# Patient Record
Sex: MALE | URBAN - METROPOLITAN AREA
[De-identification: names, ages, dates, MRNs, and addresses within clinical notes are randomized per-mention and may not be internally consistent; named-entity substitution may affect disease eponyms.]

---

## 2017-05-01 ENCOUNTER — OUTPATIENT (OUTPATIENT)
Dept: OUTPATIENT SERVICES | Facility: HOSPITAL | Age: 47
LOS: 1 days | End: 2017-05-01

## 2018-02-05 DIAGNOSIS — R69 ILLNESS, UNSPECIFIED: ICD-10-CM

## 2018-07-01 ENCOUNTER — OUTPATIENT (OUTPATIENT)
Dept: OUTPATIENT SERVICES | Facility: HOSPITAL | Age: 48
LOS: 1 days | End: 2018-07-01
Payer: MEDICARE

## 2018-07-08 DIAGNOSIS — Z71.89 OTHER SPECIFIED COUNSELING: ICD-10-CM

## 2019-05-24 VITALS
HEIGHT: 70 IN | HEART RATE: 69 BPM | TEMPERATURE: 98 F | DIASTOLIC BLOOD PRESSURE: 88 MMHG | WEIGHT: 264.55 LBS | SYSTOLIC BLOOD PRESSURE: 160 MMHG | OXYGEN SATURATION: 100 % | RESPIRATION RATE: 16 BRPM

## 2019-05-24 RX ORDER — CHLORHEXIDINE GLUCONATE 213 G/1000ML
1 SOLUTION TOPICAL ONCE
Refills: 0 | Status: DISCONTINUED | OUTPATIENT
Start: 2019-05-28 | End: 2019-06-12

## 2019-05-24 NOTE — H&P ADULT - ASSESSMENT
7yo obese (BMI 37.8) Male current smoker with SHELLFISH ALLERGY with PMHx of HTN, GERD, NAOMY, and PATRICE (on CPAP), Explosive Disorder, who presented to [facility] for recommended cardiac cath with possible intervention in light of pt's risk factors, abnormal ECG, abnormal NST and class 3 anginal symptoms.    ASA III and Mallampati III    OF NOTE: Pt was loaded with  mg PO x1 and plavix 600 mg PO x1 prior to procedure.    Pt was premedicated with Solucortef 200 IV push and Benadryl 50 mg IV  push x1 prior to procedure as per Dr. Rogers.    Risks & benefits of procedure and alternative therapy have been explained to the patient including but not limited to: allergic reaction, bleeding w/possible need for blood transfusion, infection, renal and vascular compromise, limb damage, arrhythmia, stroke, vessel dissection/perforation, Myocardial infarction, emergent CABG. Informed consent obtained and in chart. 9yo obese (BMI 37.8) Male current smoker with SHELLFISH ALLERGY with PMHx of HTN, GERD, NAOMY, and PATRICE (on CPAP), Explosive Disorder, who presented to [facility] for recommended cardiac cath with possible intervention in light of pt's risk factors, abnormal ECG, abnormal NST and class 3 anginal symptoms.    ASA III and Mallampati III    OF NOTE: Pt was loaded with  mg PO x1 and plavix 600 mg PO x1 prior to procedure.    Pt was premedicated with Solucortef 200 IV push and Benadryl 50 mg IV  push x1 prior to procedure as per Dr. Rogers.    Pt's Potassium is hemolyzed , will be rechecked in the room as per Dr. Man.     Risks & benefits of procedure and alternative therapy have been explained to the patient including but not limited to: allergic reaction, bleeding w/possible need for blood transfusion, infection, renal and vascular compromise, limb damage, arrhythmia, stroke, vessel dissection/perforation, Myocardial infarction, emergent CABG. Informed consent obtained and in chart. 9yo obese (BMI 37.8) Male current smoker with SHELLFISH ALLERGY with PMHx of HTN, GERD, NAOMY, and PATRICE (on CPAP), Explosive Disorder, who presented to [facility] for recommended cardiac cath with possible intervention in light of pt's risk factors, abnormal ECG, abnormal NST and class 3 anginal symptoms.    ASA III and Mallampati III    OF NOTE: Pt was loaded with  mg PO x1 and plavix 600 mg PO x1 prior to procedure.    No premedication needed as per Dr. Man for shellfish allergy.     Pt's Potassium is hemolyzed , will be rechecked in the room as per Dr. Man.     Risks & benefits of procedure and alternative therapy have been explained to the patient including but not limited to: allergic reaction, bleeding w/possible need for blood transfusion, infection, renal and vascular compromise, limb damage, arrhythmia, stroke, vessel dissection/perforation, Myocardial infarction, emergent CABG. Informed consent obtained and in chart.

## 2019-05-24 NOTE — H&P ADULT - NSHPSOCIALHISTORY_GEN_ALL_CORE
TOBACCO: 1/2 pk cigarettes per day > 20years  Denies EToH, Illicit Drug use    on Disability, ambulates with walker

## 2019-05-24 NOTE — H&P ADULT - HISTORY OF PRESENT ILLNESS
SKELETON    47yo morbidly obese M with PMHx of HTN who presented to his cardiologist complaining of worsening SOB on minimal ambulation SKELETON    49yo morbidly obese M with PMHx of HTN and PATRICE (on CPAP) who presented to his cardiologist complaining of worsening SOB on minimal ambulation. NST 4/12/19 revealed normal myocardial perfusion imaging, severe global hypokinesis with EF 28%, dilated cardiomyopathy with end diastolic volume 158cc, transient ischemic dilatation noted. Echo 5/21/19 revealed moderate concentric LVH, EF 55%, grade 2-3 diastolic dysfunction consistent with impaired relaxation and mild-moderate increase in filling pressures. SKELETON    47yo morbidly obese M with PMHx of HTN and PATRICE (on CPAP) who presented to his cardiologist complaining of worsening SOB on minimal ambulation. NST 4/12/19 revealed normal myocardial perfusion imaging, severe global hypokinesis with EF 28%, dilated cardiomyopathy with end diastolic volume 158cc, transient ischemic dilatation noted. Echo 5/21/19 revealed moderate concentric LVH, EF 55%, grade 2-3 diastolic dysfunction consistent with impaired relaxation and mild-moderate increase in filling pressures.    In light of patient's symptoms, class III anginal equivalent symptoms, reduced EF and abnormal NST showing TID, patient is now referred to [facility] for recommended cardiac catheterization with possible intervention. 47yo obese (BMI 37.8) Male current smoker with SHELLFISH ALLERGY with PMHx of HTN, GERD, NAOMY, and PATRICE (on CPAP), Explosive Disorder, who was referred to Cardiologist after having abnormal ECG at PMD office.  Pt reports complaints of worsening SOB on minimal ambulation less than 1 block which is a decrease in activity tolerance since January (states could walk few blocks at his pace). He also endorses exertional LSCP described as "pressure like pulling sensation" that is relieved with rest.  He has chronic LE edema and endorses he chronically sleeps on 2 pillows "stuffed with some towels to elevate his head".  He denies any palpitations, dizziness, n/v, syncope, PND.  In office, EKG showed SR inferior ST depressions with LVH.  NST 4/12/19 revealed normal myocardial perfusion imaging, severe global hypokinesis with EF 28%, dilated cardiomyopathy with end diastolic volume 158cc, transient ischemic dilatation noted. Echo 5/21/19 revealed moderate concentric LVH, EF 55%, grade 2-3 diastolic dysfunction consistent with impaired relaxation and mild-moderate increase in filling pressures.    In light of patient's symptoms, class III anginal equivalent symptoms, Discordant LV function on non-invasive testing with abnormal NST showing TID, patient is now referred to [facility] for recommended cardiac catheterization with possible intervention.

## 2019-05-24 NOTE — H&P ADULT - NSICDXFAMILYHX_GEN_ALL_CORE_FT
FAMILY HISTORY:  Family history of AIDS, Father -   Family history of kidney disease in mother, ESRD on HD  Family hx of hypertension, mother  FH: diabetes mellitus, mother

## 2019-05-24 NOTE — H&P ADULT - NSICDXPASTMEDICALHX_GEN_ALL_CORE_FT
PAST MEDICAL HISTORY:  HTN (hypertension)     PATRICE on CPAP PAST MEDICAL HISTORY:  Asthma     Explosive personality disorder in adult goes to [facility]    GERD without esophagitis     H/O iron deficiency     HTN (hypertension)     Lactose intolerance     Low back pain     PATRICE on CPAP

## 2019-05-28 ENCOUNTER — OUTPATIENT (OUTPATIENT)
Dept: OUTPATIENT SERVICES | Facility: HOSPITAL | Age: 49
LOS: 1 days | Discharge: HOME | End: 2019-05-28
Payer: MEDICARE

## 2019-05-28 DIAGNOSIS — Z98.890 OTHER SPECIFIED POSTPROCEDURAL STATES: Chronic | ICD-10-CM

## 2019-05-28 LAB
ALBUMIN SERPL ELPH-MCNC: 4.1 G/DL — SIGNIFICANT CHANGE UP (ref 3.3–5)
ALP SERPL-CCNC: SIGNIFICANT CHANGE UP U/L (ref 40–120)
ALT FLD-CCNC: SIGNIFICANT CHANGE UP U/L (ref 10–45)
ANION GAP SERPL CALC-SCNC: 13 MMOL/L — SIGNIFICANT CHANGE UP (ref 5–17)
ANION GAP SERPL CALC-SCNC: 14 MMOL/L — SIGNIFICANT CHANGE UP (ref 5–17)
APTT BLD: 31.2 SEC — SIGNIFICANT CHANGE UP (ref 27.5–36.3)
AST SERPL-CCNC: SIGNIFICANT CHANGE UP U/L (ref 10–40)
BASOPHILS # BLD AUTO: 0.03 K/UL — SIGNIFICANT CHANGE UP (ref 0–0.2)
BASOPHILS NFR BLD AUTO: 0.4 % — SIGNIFICANT CHANGE UP (ref 0–2)
BILIRUB SERPL-MCNC: 0.4 MG/DL — SIGNIFICANT CHANGE UP (ref 0.2–1.2)
BUN SERPL-MCNC: 10 MG/DL — SIGNIFICANT CHANGE UP (ref 7–23)
BUN SERPL-MCNC: 12 MG/DL — SIGNIFICANT CHANGE UP (ref 7–23)
CALCIUM SERPL-MCNC: 8.8 MG/DL — SIGNIFICANT CHANGE UP (ref 8.4–10.5)
CALCIUM SERPL-MCNC: 9 MG/DL — SIGNIFICANT CHANGE UP (ref 8.4–10.5)
CHLORIDE SERPL-SCNC: 103 MMOL/L — SIGNIFICANT CHANGE UP (ref 96–108)
CHLORIDE SERPL-SCNC: 105 MMOL/L — SIGNIFICANT CHANGE UP (ref 96–108)
CHOLEST SERPL-MCNC: 121 MG/DL — SIGNIFICANT CHANGE UP (ref 10–199)
CK MB CFR SERPL CALC: 3 NG/ML — SIGNIFICANT CHANGE UP (ref 0–6.7)
CK SERPL-CCNC: SIGNIFICANT CHANGE UP U/L (ref 30–200)
CO2 SERPL-SCNC: 23 MMOL/L — SIGNIFICANT CHANGE UP (ref 22–31)
CO2 SERPL-SCNC: 26 MMOL/L — SIGNIFICANT CHANGE UP (ref 22–31)
CREAT SERPL-MCNC: 0.92 MG/DL — SIGNIFICANT CHANGE UP (ref 0.5–1.3)
CREAT SERPL-MCNC: 1.06 MG/DL — SIGNIFICANT CHANGE UP (ref 0.5–1.3)
EOSINOPHIL # BLD AUTO: 0.13 K/UL — SIGNIFICANT CHANGE UP (ref 0–0.5)
EOSINOPHIL NFR BLD AUTO: 1.7 % — SIGNIFICANT CHANGE UP (ref 0–6)
GLUCOSE SERPL-MCNC: 117 MG/DL — HIGH (ref 70–99)
GLUCOSE SERPL-MCNC: 175 MG/DL — HIGH (ref 70–99)
HBA1C BLD-MCNC: 6.1 % — HIGH (ref 4–5.6)
HCT VFR BLD CALC: 40 % — SIGNIFICANT CHANGE UP (ref 39–50)
HDLC SERPL-MCNC: 17 MG/DL — LOW
HGB BLD-MCNC: 12.9 G/DL — LOW (ref 13–17)
IMM GRANULOCYTES NFR BLD AUTO: 0.4 % — SIGNIFICANT CHANGE UP (ref 0–1.5)
INR BLD: 1.09 — SIGNIFICANT CHANGE UP (ref 0.88–1.16)
LIPID PNL WITH DIRECT LDL SERPL: 68 MG/DL — SIGNIFICANT CHANGE UP
LYMPHOCYTES # BLD AUTO: 2.91 K/UL — SIGNIFICANT CHANGE UP (ref 1–3.3)
LYMPHOCYTES # BLD AUTO: 37.5 % — SIGNIFICANT CHANGE UP (ref 13–44)
MCHC RBC-ENTMCNC: 29.1 PG — SIGNIFICANT CHANGE UP (ref 27–34)
MCHC RBC-ENTMCNC: 32.3 GM/DL — SIGNIFICANT CHANGE UP (ref 32–36)
MCV RBC AUTO: 90.3 FL — SIGNIFICANT CHANGE UP (ref 80–100)
MONOCYTES # BLD AUTO: 0.56 K/UL — SIGNIFICANT CHANGE UP (ref 0–0.9)
MONOCYTES NFR BLD AUTO: 7.2 % — SIGNIFICANT CHANGE UP (ref 2–14)
NEUTROPHILS # BLD AUTO: 4.1 K/UL — SIGNIFICANT CHANGE UP (ref 1.8–7.4)
NEUTROPHILS NFR BLD AUTO: 52.8 % — SIGNIFICANT CHANGE UP (ref 43–77)
NRBC # BLD: 0 /100 WBCS — SIGNIFICANT CHANGE UP (ref 0–0)
PLATELET # BLD AUTO: 231 K/UL — SIGNIFICANT CHANGE UP (ref 150–400)
POTASSIUM SERPL-MCNC: 3.1 MMOL/L — LOW (ref 3.5–5.3)
POTASSIUM SERPL-MCNC: SIGNIFICANT CHANGE UP MMOL/L (ref 3.5–5.3)
POTASSIUM SERPL-SCNC: 3.1 MMOL/L — LOW (ref 3.5–5.3)
POTASSIUM SERPL-SCNC: SIGNIFICANT CHANGE UP MMOL/L (ref 3.5–5.3)
PROT SERPL-MCNC: 7.3 G/DL — SIGNIFICANT CHANGE UP (ref 6–8.3)
PROTHROM AB SERPL-ACNC: 12.4 SEC — SIGNIFICANT CHANGE UP (ref 10–12.9)
RBC # BLD: 4.43 M/UL — SIGNIFICANT CHANGE UP (ref 4.2–5.8)
RBC # FLD: 13.8 % — SIGNIFICANT CHANGE UP (ref 10.3–14.5)
SODIUM SERPL-SCNC: 142 MMOL/L — SIGNIFICANT CHANGE UP (ref 135–145)
SODIUM SERPL-SCNC: 142 MMOL/L — SIGNIFICANT CHANGE UP (ref 135–145)
TOTAL CHOLESTEROL/HDL RATIO MEASUREMENT: 7.1 RATIO — SIGNIFICANT CHANGE UP (ref 3.4–9.6)
TRIGL SERPL-MCNC: 182 MG/DL — HIGH (ref 10–149)
WBC # BLD: 7.76 K/UL — SIGNIFICANT CHANGE UP (ref 3.8–10.5)
WBC # FLD AUTO: 7.76 K/UL — SIGNIFICANT CHANGE UP (ref 3.8–10.5)

## 2019-05-28 PROCEDURE — 93458 L HRT ARTERY/VENTRICLE ANGIO: CPT | Mod: 26

## 2019-05-28 PROCEDURE — 99204 OFFICE O/P NEW MOD 45 MIN: CPT

## 2019-05-28 PROCEDURE — 93010 ELECTROCARDIOGRAM REPORT: CPT

## 2019-05-28 RX ORDER — CLOPIDOGREL BISULFATE 75 MG/1
600 TABLET, FILM COATED ORAL ONCE
Refills: 0 | Status: COMPLETED | OUTPATIENT
Start: 2019-05-28 | End: 2019-05-28

## 2019-05-28 RX ORDER — SODIUM CHLORIDE 9 MG/ML
500 INJECTION, SOLUTION INTRAVENOUS
Refills: 0 | Status: DISCONTINUED | OUTPATIENT
Start: 2019-05-28 | End: 2019-05-28

## 2019-05-28 RX ORDER — POTASSIUM CHLORIDE 20 MEQ
10 PACKET (EA) ORAL ONCE
Refills: 0 | Status: COMPLETED | OUTPATIENT
Start: 2019-05-28 | End: 2019-05-28

## 2019-05-28 RX ORDER — POTASSIUM CHLORIDE 20 MEQ
60 PACKET (EA) ORAL ONCE
Refills: 0 | Status: COMPLETED | OUTPATIENT
Start: 2019-05-28 | End: 2019-05-28

## 2019-05-28 RX ORDER — POTASSIUM CHLORIDE 20 MEQ
10 PACKET (EA) ORAL ONCE
Refills: 0 | Status: DISCONTINUED | OUTPATIENT
Start: 2019-05-28 | End: 2019-06-12

## 2019-05-28 RX ORDER — POTASSIUM CHLORIDE 20 MEQ
40 PACKET (EA) ORAL ONCE
Refills: 0 | Status: COMPLETED | OUTPATIENT
Start: 2019-05-28 | End: 2019-05-28

## 2019-05-28 RX ORDER — POTASSIUM CHLORIDE 20 MEQ
1 PACKET (EA) ORAL
Qty: 5 | Refills: 0
Start: 2019-05-28 | End: 2019-06-01

## 2019-05-28 RX ORDER — SODIUM CHLORIDE 9 MG/ML
500 INJECTION, SOLUTION INTRAVENOUS
Refills: 0 | Status: DISCONTINUED | OUTPATIENT
Start: 2019-05-28 | End: 2019-06-12

## 2019-05-28 RX ORDER — ASPIRIN/CALCIUM CARB/MAGNESIUM 324 MG
325 TABLET ORAL ONCE
Refills: 0 | Status: COMPLETED | OUTPATIENT
Start: 2019-05-28 | End: 2019-05-28

## 2019-05-28 RX ADMIN — Medication 60 MILLIEQUIVALENT(S): at 14:52

## 2019-05-28 RX ADMIN — Medication 40 MILLIEQUIVALENT(S): at 18:15

## 2019-05-28 RX ADMIN — Medication 100 MILLIEQUIVALENT(S): at 14:52

## 2019-05-28 RX ADMIN — SODIUM CHLORIDE 30 MILLILITER(S): 9 INJECTION, SOLUTION INTRAVENOUS at 12:40

## 2019-05-28 RX ADMIN — CLOPIDOGREL BISULFATE 600 MILLIGRAM(S): 75 TABLET, FILM COATED ORAL at 12:39

## 2019-05-28 RX ADMIN — Medication 325 MILLIGRAM(S): at 12:39

## 2019-05-28 NOTE — PROGRESS NOTE ADULT - SUBJECTIVE AND OBJECTIVE BOX
Interventional Cardiology PA SDA Discharge Note    Patient without complaints. Ambulated and voided without difficulties    Afebrile, VSS    Ext:    		  		Right              Radial :  No  hematoma, no    bleeding, dressing; C/D/I      Pulses:    intact RAD to baseline     A/P:      47yo obese (BMI 37.8) Male current smoker with SHELLFISH ALLERGY with PMHx of HTN, GERD, NAOMY, and PATRICE (on CPAP), Explosive Disorder, who was referred to Cardiologist after having abnormal ECG at PMD office.  Pt reports complaints of worsening SOB on minimal ambulation less than 1 block which is a decrease in activity tolerance since January (states could walk few blocks at his pace). He also endorses exertional LSCP described as "pressure like pulling sensation" that is relieved with rest.  He has chronic LE edema and endorses he chronically sleeps on 2 pillows "stuffed with some towels to elevate his head".  He denies any palpitations, dizziness, n/v, syncope, PND.  In office, EKG showed SR inferior ST depressions with LVH.  NST 4/12/19 revealed normal myocardial perfusion imaging, severe global hypokinesis with EF 28%, dilated cardiomyopathy with end diastolic volume 158cc, transient ischemic dilatation noted. Echo 5/21/19 revealed moderate concentric LVH, EF 55%, grade 2-3 diastolic dysfunction consistent with impaired relaxation and mild-moderate increase in filling pressures.  In light of patient's symptoms, class III anginal equivalent symptoms, Discordant LV function on non-invasive testing with abnormal NST showing TID, patient is now referred to [facility] for recommended cardiac catheterization with possible intervention. Pt now s/p cardiac cath 5/28/19 showing non obstructive CAD, EF 50%, EDP 25 mmHg, K+ 2.7 sent from room.  Potassium repleated post cath with Kdur 60 mEq x1, K+ 10 mEq IV x1, Kdur 40 mEq x1 @ 6pm with repeat BMP scheduled for 6pm prior to discharge.               1.	Stable for discharge as per attending Dr. Man after bed rest, pt voids, wrist stable and 30 minutes of ambulation.  2.	Follow-up with PMD/Cardiologist Dr. Jackson in 1-2 weeks  3.	Discharged forms signed and copies in chart Interventional Cardiology PA SDA Discharge Note    Patient without complaints. Ambulated and voided without difficulties    Afebrile, VSS    Ext:    		  		Right              Radial :  No  hematoma, no    bleeding, dressing; C/D/I      Pulses:    intact RAD to baseline     A/P:      49yo obese (BMI 37.8) Male current smoker with SHELLFISH ALLERGY with PMHx of HTN, GERD, NAOMY, and PATRICE (on CPAP), Explosive Disorder, who was referred to Cardiologist after having abnormal ECG at PMD office.  Pt reports complaints of worsening SOB on minimal ambulation less than 1 block which is a decrease in activity tolerance since January (states could walk few blocks at his pace). He also endorses exertional LSCP described as "pressure like pulling sensation" that is relieved with rest.  He has chronic LE edema and endorses he chronically sleeps on 2 pillows "stuffed with some towels to elevate his head".  He denies any palpitations, dizziness, n/v, syncope, PND.  In office, EKG showed SR inferior ST depressions with LVH.  NST 4/12/19 revealed normal myocardial perfusion imaging, severe global hypokinesis with EF 28%, dilated cardiomyopathy with end diastolic volume 158cc, transient ischemic dilatation noted. Echo 5/21/19 revealed moderate concentric LVH, EF 55%, grade 2-3 diastolic dysfunction consistent with impaired relaxation and mild-moderate increase in filling pressures.  In light of patient's symptoms, class III anginal equivalent symptoms, Discordant LV function on non-invasive testing with abnormal NST showing TID, patient is now referred to [facility] for recommended cardiac catheterization with possible intervention. Pt now s/p cardiac cath 5/28/19 showing non obstructive CAD, EF 50%, EDP 25 mmHg, K+ 2.7 sent from room.  Potassium repleated post cath with Kdur 60 mEq x1, K+ 10 mEq IV x1, Kdur 40 mEq x1 @ 6pm with repeat BMP scheduled @ 6pm K 3.1 and Istat 3.2.  Next dose of 40 mEq would not be given until 10 pm and pt unable to stay for additional runs of IV potassium due to his family member needing to check into a shelter tonight before 10 pm.  As discussed with Dr. Man pt discharged with Potassium 20 mEq daily x 5 days which was e-prescribed to his preferred pharmacy with instructions to follow up with his PCP after 5 days to follow up his potassium level.             1.	Stable for discharge as per attending Dr. Man after bed rest, pt voids, wrist stable and 30 minutes of ambulation.  2.	Follow-up with PMD/Cardiologist Dr. Jackson in 1-2 weeks  3.	Discharged forms signed and copies in chart

## 2019-06-03 DIAGNOSIS — I10 ESSENTIAL (PRIMARY) HYPERTENSION: ICD-10-CM

## 2019-06-03 DIAGNOSIS — R93.1 ABNORMAL FINDINGS ON DIAGNOSTIC IMAGING OF HEART AND CORONARY CIRCULATION: ICD-10-CM

## 2019-06-03 DIAGNOSIS — I25.110 ATHEROSCLEROTIC HEART DISEASE OF NATIVE CORONARY ARTERY WITH UNSTABLE ANGINA PECTORIS: ICD-10-CM

## 2021-12-01 PROCEDURE — G9005: CPT

## 2022-05-19 VITALS
TEMPERATURE: 98 F | WEIGHT: 263.01 LBS | RESPIRATION RATE: 18 BRPM | DIASTOLIC BLOOD PRESSURE: 99 MMHG | SYSTOLIC BLOOD PRESSURE: 189 MMHG | HEART RATE: 80 BPM | OXYGEN SATURATION: 100 % | HEIGHT: 68 IN

## 2022-05-19 RX ORDER — CHLORHEXIDINE GLUCONATE 213 G/1000ML
1 SOLUTION TOPICAL ONCE
Refills: 0 | Status: DISCONTINUED | OUTPATIENT
Start: 2022-06-14 | End: 2022-06-29

## 2022-05-19 NOTE — H&P ADULT - NSICDXPASTMEDICALHX_GEN_ALL_CORE_FT
PAST MEDICAL HISTORY:  Asthma     Explosive personality disorder in adult goes to [facility]    GERD without esophagitis     H/O iron deficiency     HTN (hypertension)     Lactose intolerance     Low back pain     PATRICE on CPAP

## 2022-05-19 NOTE — H&P ADULT - ASSESSMENT
52yo Male, current daily smoker (up to 1 PPD), occasional marijuana user with a known SHELLFISH ALLERGY (angioedema), and a PMHx of non-obstructive CAD (s/p cardiac cath 5/28/2019 @ [facility]), HTN, HLD, GERD, iron deficiency anemia, PATRICE (on CPAP), and explosive personality disorder presents for cardiac catheterization with possible intervention if clinically indicated due to patient's risk factors, CCS Angina Class IV Symptoms, abnormal NST.     Risks & benefits of procedure and alternative therapy have been explained to the patient including but not limited to: allergic reaction, bleeding w/possible need for blood transfusion, infection, renal and vascular compromise, limb damage, arrhythmia, stroke, vessel dissection/perforation, Myocardial infarction, emergent CABG. Informed consent obtained and in chart.    -H/H: 12.9/39.8. Pt denies BRBPR, hematuria, hematochezia, melena. Patient did not take any medication today; To load with  mg x one dose and Plavix 600 mg x one dose.  -Cr: _____. EF 38% by NST. Euvolemic on exam.  Will order NS @ 75 cc/hour x four hours pre procedure  -Shellfish Allergy: Angioedema; To receive Solucortef 200 mg IV x one dose (To be within 4 hours of procedure) and Benadryl 50 mg IVP ON CALL TO CATH LAB  -Cardiac Catheterization ordered placed   -Home Medication Confirmed via:   -Type of sedation: Moderate  -Candidate for sedation: Yes  50yo Male, current daily smoker (up to 1 PPD), occasional marijuana user with a known SHELLFISH ALLERGY (angioedema), and a PMHx of non-obstructive CAD (s/p cardiac cath 5/28/2019 @ [facility]), HTN, HLD, GERD, iron deficiency anemia, PATRICE (on CPAP), and explosive personality disorder presents for cardiac catheterization with possible intervention if clinically indicated due to patient's risk factors, CCS Angina Class IV Symptoms, abnormal NST.     Risks & benefits of procedure and alternative therapy have been explained to the patient including but not limited to: allergic reaction, bleeding w/possible need for blood transfusion, infection, renal and vascular compromise, limb damage, arrhythmia, stroke, vessel dissection/perforation, Myocardial infarction, emergent CABG. Informed consent obtained and in chart.    -H/H: 12.9/39.8. Pt denies BRBPR, hematuria, hematochezia, melena. Patient did not take any medication today; To load with  mg x one dose and Plavix 600 mg x one dose.  -Cr: 0.99.  EF 38% by NST. Euvolemic on exam.  Will order NS @ 75 cc/hour x four hours pre procedure  -Shellfish Allergy: Angioedema; To receive Solucortef 200 mg IV x one dose (To be within 4 hours of procedure) and Benadryl 50 mg IVP ON CALL TO CATH LAB  -Cardiac Catheterization ordered placed   -Home Medication Confirmed via:  [facility] ([id number] [address]). Medications confirmed with pharmacy and  Nicholas: [phone]. Patient does not names of any of his medications; but notes he did not take anything and no longer takes any meds for explosive personality disorder.  -Type of sedation: Moderate  -Candidate for sedation: Yes  50yo Male, current daily smoker (up to 1 PPD), occasional marijuana user with a known SHELLFISH ALLERGY (angioedema), and a PMHx of non-obstructive CAD (s/p cardiac cath 5/28/2019 @ [facility]), HTN, HLD, GERD, iron deficiency anemia, PATRICE (on CPAP), and explosive personality disorder presents for cardiac catheterization with possible intervention if clinically indicated due to patient's risk factors, CCS Angina Class IV Symptoms, abnormal NST.     Risks & benefits of procedure and alternative therapy have been explained to the patient including but not limited to: allergic reaction, bleeding w/possible need for blood transfusion, infection, renal and vascular compromise, limb damage, arrhythmia, stroke, vessel dissection/perforation, Myocardial infarction, emergent CABG. Informed consent obtained and in chart.    COVID: PENDING (can't accept the results from 6/7/2022)/In addition, patient brought in Rapid Ag test from [facility]; will need to reswab.  -H/H: 12.9/39.8. Pt denies BRBPR, hematuria, hematochezia, melena. Patient did not take any medication today; To load with  mg x one dose and Plavix 600 mg x one dose.  -Cr: 0.99.  EF 38% by NST. Euvolemic on exam.  Will order NS @ 75 cc/hour x four hours pre procedure  -Shellfish Allergy: Angioedema; To receive Solucortef 200 mg IV x one dose (To be within 4 hours of procedure) and Benadryl 50 mg IVP ON CALL TO CATH LAB  -Cardiac Catheterization ordered placed   -Home Medication Confirmed via:  [facility] ([address] [place] [address] Code). Medications confirmed with pharmacy and  Nicholas: [phone]. Patient does not names of any of his medications; but notes he did not take anything and no longer takes any meds for explosive personality disorder.  -Type of sedation: Moderate  -Candidate for sedation: Yes

## 2022-05-19 NOTE — H&P ADULT - HISTORY OF PRESENT ILLNESS
CARDIOLOGIST: Dr. Harish Jackson  COVID: ________________  PHARMACY: ___________    Pt is 52yo Male w/ SHELLFISH ALLERGY and PMHx of HTN, HLD, GERD, iron deficiency anemia, PATRICE (on CPAP), and explosive personality disorder who presented to cardiologist, Dr. Jackson, for cardiac evaluation. Patient denying symptoms at this time. Pt denies CP, SOB, PARTIDA, palpitations, LE edema, orthopnea, PND, abdominal pain, N/V, fever/chills. Patient was sent for NST, which resulted abnormal.     NST (1/28/22): medium sized reversible defect in lateral wall, small reversible defect in anterior wall, and small nonreversible defect involving the inferior wall w/ an adjacent small reversible defect in the inferior wall.     In light of patient's risk factors and abnormal NST, patient now presents to [facility] for cardiac catheterization with possible intervention if clinically indicated.       Cardiac Cath (5/28/2019): LM normal, mLAD 30% (medium sized), dLAD mild disease (small), LCx mild LI (co-dominant vessel), OM1 mild disease (medium sized). CARDIOLOGIST: Dr. Harish Jackson  COVID: ________________  PHARMACY: ___________    Pt is 52yo Male w/ SHELLFISH ALLERGY and PMHx of non obstructive CAD (s/p cardiac cath 5/28/2019 @ [facility]),  HTN, HLD, GERD, iron deficiency anemia, PATRICE (on CPAP), and explosive personality disorder who presented to cardiologist, Dr. Jackson, for cardiac evaluation. Patient denying symptoms at this time. Pt denies CP, SOB, PARTIDA, palpitations, LE edema, orthopnea, PND, abdominal pain, N/V, fever/chills. Patient was sent for NST, which resulted abnormal.     NST (1/28/22): medium sized reversible defect in lateral wall, small reversible defect in anterior wall, and small nonreversible defect involving the inferior wall w/ an adjacent small reversible defect in the inferior wall.     In light of patient's risk factors and abnormal NST, patient now presents to [facility] for cardiac catheterization with possible intervention if clinically indicated.       Cardiac Cath (5/28/2019): LM normal, mLAD 30% (medium sized), dLAD mild disease (small), LCx mild LI (co-dominant vessel), OM1 mild disease (medium sized). CARDIOLOGIST: Dr. Harish Jackson  COVID: ________________  PHARMACY: ___________    Pt is 50yo Male w/ SHELLFISH ALLERGY and PMHx of non obstructive CAD (s/p cardiac cath 5/28/2019 @ [facility]),  HTN, HLD, GERD, iron deficiency anemia, PATRICE (on CPAP), and explosive personality disorder who presented to cardiologist, Dr. Jackson, for cardiac evaluation. Pt denies CP, SOB, PARTIDA, palpitations, LE edema, orthopnea, PND, abdominal pain, N/V, fever/chills. Patient was sent for NST, which resulted abnormal.     Myocardial Perfusion PET/CT (1/28/22): medium sized reversible defect in lateral wall, small reversible defect in anterior wall, and small nonreversible defect involving the inferior wall w/ an adjacent small reversible defect in the inferior wall. The presence of a stress induced anterior wall motion abnormality suggests that the LAD stenosis is severe. LVEF 38%, severe hypokinesis in the lateral wall, moderate hypokinesis in the inferior wall and mild hypokinesis in the anterior and apical walls. Worsening of wall motion in the anterior, inferior and lateral wall.     In light of patient's risk factors and abnormal NST, patient now presents to [facility] for cardiac catheterization with possible intervention if clinically indicated.     Prior Cath History  Cardiac Cath (5/28/2019): LM normal, mLAD 30% (medium sized), dLAD mild disease (small), LCx mild LI (co-dominant vessel), OM1 mild disease (medium sized). CARDIOLOGIST: Dr. Harish Jackson  COVID: ________________  PHARMACY: [facility] ([facility] Zip Code)    50yo Male, current daily smoker (up to 1 PPD), occasional marijuana user with a known SHELLFISH ALLERGY (angioedema), and a PMHx of non-obstructive CAD (s/p cardiac cath 5/28/2019 @ [facility]), HTN, HLD, GERD, iron deficiency anemia, PATRICE (on CPAP), and explosive personality disorder who presented to cardiologist, Dr. Jackson due to intermittent non radiating left sided chest pain when he is stressed, upset or excited that resolves once he calms down. In addition, patient endorses PARTIDA when ambulating more than 2.5 blocks and chronic lower extremity edema. Pt denies palpitations, orthopnea, PND, abdominal pain, N/V, fever/chills.   Myocardial Perfusion PET/CT (1/28/22): medium sized reversible defect in lateral wall, small reversible defect in anterior wall, and small nonreversible defect involving the inferior wall w/ an adjacent small reversible defect in the inferior wall. The presence of a stress induced anterior wall motion abnormality suggests that the LAD stenosis is severe. LVEF 38%, severe hypokinesis in the lateral wall, moderate hypokinesis in the inferior wall and mild hypokinesis in the anterior and apical walls. Worsening of wall motion in the anterior, inferior and lateral wall.   In light of patient's risk factors and abnormal NST, patient now presents to [facility] for cardiac catheterization with possible intervention if clinically indicated.   Prior Cath History  Cardiac Cath (5/28/2019): LM normal, mLAD 30% (medium sized), dLAD mild disease (small), LCx mild LI (co-dominant vessel), OM1 mild disease (medium sized)   CARDIOLOGIST: Dr. Harish Jackson  COVID: PENDING (can't accept the results from 6/7/2022)  PHARMACY: [facility] ([id number] [address])  52yo Male, current daily smoker (up to 1 PPD), occasional marijuana user with a known SHELLFISH ALLERGY (angioedema), and a PMHx of non-obstructive CAD (s/p cardiac cath 5/28/2019 @ [facility]), HTN, HLD, GERD, iron deficiency anemia, PATRICE (on CPAP), and explosive personality disorder who presented to cardiologist, Dr. Jackson due to intermittent non radiating left sided chest pain when he is stressed, upset or excited that resolves once he calms down. In addition, patient endorses PARTIDA when ambulating more than 2.5 blocks and chronic lower extremity edema. Pt denies palpitations, orthopnea, PND, abdominal pain, N/V, fever/chills.   Myocardial Perfusion PET/CT (1/28/22): medium sized reversible defect in lateral wall, small reversible defect in anterior wall, and small nonreversible defect involving the inferior wall w/ an adjacent small reversible defect in the inferior wall. The presence of a stress induced anterior wall motion abnormality suggests that the LAD stenosis is severe. LVEF 38%, severe hypokinesis in the lateral wall, moderate hypokinesis in the inferior wall and mild hypokinesis in the anterior and apical walls. Worsening of wall motion in the anterior, inferior and lateral wall.   In light of patient's risk factors, CCS Angina Class IV Symptoms, abnormal NST, patient now presents to [facility] for cardiac catheterization with possible intervention if clinically indicated.   Prior Cath History  Cardiac Cath (5/28/2019): LM normal, mLAD 30% (medium sized), dLAD mild disease (small), LCx mild LI (co-dominant vessel), OM1 mild disease (medium sized)   CARDIOLOGIST: Dr. Harish Jackson  COVID: PENDING (can't accept the results from 6/7/2022)/In addition, patient brought in Rapid Ag test from [facility]; will need to reswab.  PHARMACY: [facility] ([address] [address]). Medications confirmed with pharmacy and  Nicholas: [phone]  50yo Male, current daily smoker (up to 1 PPD), occasional marijuana user with a known SHELLFISH ALLERGY (angioedema), and a PMHx of non-obstructive CAD (s/p cardiac cath 5/28/2019 @ [facility]), HTN, HLD, GERD, iron deficiency anemia, PATRICE (on CPAP), and explosive personality disorder (currently not on any medication for this) who presented to cardiologist, Dr. Jackson due to intermittent non radiating left sided chest pain when he is stressed, upset or excited that resolves once he calms down. In addition, patient endorses PARTIDA when ambulating more than 2.5 blocks and chronic lower extremity edema. Pt denies palpitations, orthopnea, PND, abdominal pain, N/V, fever/chills.   Myocardial Perfusion PET/CT (1/28/22): medium sized reversible defect in lateral wall, small reversible defect in anterior wall, and small nonreversible defect involving the inferior wall w/ an adjacent small reversible defect in the inferior wall. The presence of a stress induced anterior wall motion abnormality suggests that the LAD stenosis is severe. LVEF 38%, severe hypokinesis in the lateral wall, moderate hypokinesis in the inferior wall and mild hypokinesis in the anterior and apical walls. Worsening of wall motion in the anterior, inferior and lateral wall.   In light of patient's risk factors, CCS Angina Class IV Symptoms, abnormal NST, patient now presents to [facility] for cardiac catheterization with possible intervention if clinically indicated.   Prior Cath History  Cardiac Cath (5/28/2019): LM normal, mLAD 30% (medium sized), dLAD mild disease (small), LCx mild LI (co-dominant vessel), OM1 mild disease (medium sized)

## 2022-06-14 ENCOUNTER — OUTPATIENT (OUTPATIENT)
Dept: OUTPATIENT SERVICES | Facility: HOSPITAL | Age: 52
LOS: 1 days | End: 2022-06-14
Payer: MEDICARE

## 2022-06-14 DIAGNOSIS — Z98.890 OTHER SPECIFIED POSTPROCEDURAL STATES: Chronic | ICD-10-CM

## 2022-06-14 DIAGNOSIS — Z11.52 ENCOUNTER FOR SCREENING FOR COVID-19: ICD-10-CM

## 2022-06-14 LAB
A1C WITH ESTIMATED AVERAGE GLUCOSE RESULT: 6.5 % — HIGH (ref 4–5.6)
ANION GAP SERPL CALC-SCNC: 11 MMOL/L — SIGNIFICANT CHANGE UP (ref 5–17)
APTT BLD: 29.2 SEC — SIGNIFICANT CHANGE UP (ref 27.5–35.5)
BASOPHILS # BLD AUTO: 0.03 K/UL — SIGNIFICANT CHANGE UP (ref 0–0.2)
BASOPHILS NFR BLD AUTO: 0.3 % — SIGNIFICANT CHANGE UP (ref 0–2)
BUN SERPL-MCNC: 13 MG/DL — SIGNIFICANT CHANGE UP (ref 7–23)
CALCIUM SERPL-MCNC: 9 MG/DL — SIGNIFICANT CHANGE UP (ref 8.4–10.5)
CHLORIDE SERPL-SCNC: 103 MMOL/L — SIGNIFICANT CHANGE UP (ref 96–108)
CHOLEST SERPL-MCNC: 127 MG/DL — SIGNIFICANT CHANGE UP
CK MB CFR SERPL CALC: 2.4 NG/ML — SIGNIFICANT CHANGE UP (ref 0–6.7)
CK SERPL-CCNC: 328 U/L — HIGH (ref 30–200)
CO2 SERPL-SCNC: 28 MMOL/L — SIGNIFICANT CHANGE UP (ref 22–31)
CREAT SERPL-MCNC: 0.99 MG/DL — SIGNIFICANT CHANGE UP (ref 0.5–1.3)
EGFR: 93 ML/MIN/1.73M2 — SIGNIFICANT CHANGE UP
EOSINOPHIL # BLD AUTO: 0.15 K/UL — SIGNIFICANT CHANGE UP (ref 0–0.5)
EOSINOPHIL NFR BLD AUTO: 1.7 % — SIGNIFICANT CHANGE UP (ref 0–6)
ESTIMATED AVERAGE GLUCOSE: 140 MG/DL — HIGH (ref 68–114)
GLUCOSE SERPL-MCNC: 101 MG/DL — HIGH (ref 70–99)
HCT VFR BLD CALC: 39.8 % — SIGNIFICANT CHANGE UP (ref 39–50)
HDLC SERPL-MCNC: 19 MG/DL — LOW
HGB BLD-MCNC: 12.9 G/DL — LOW (ref 13–17)
IMM GRANULOCYTES NFR BLD AUTO: 0.5 % — SIGNIFICANT CHANGE UP (ref 0–1.5)
INR BLD: 1.06 — SIGNIFICANT CHANGE UP (ref 0.88–1.16)
LIPID PNL WITH DIRECT LDL SERPL: 80 MG/DL — SIGNIFICANT CHANGE UP
LYMPHOCYTES # BLD AUTO: 2.67 K/UL — SIGNIFICANT CHANGE UP (ref 1–3.3)
LYMPHOCYTES # BLD AUTO: 30.9 % — SIGNIFICANT CHANGE UP (ref 13–44)
MCHC RBC-ENTMCNC: 28.7 PG — SIGNIFICANT CHANGE UP (ref 27–34)
MCHC RBC-ENTMCNC: 32.4 GM/DL — SIGNIFICANT CHANGE UP (ref 32–36)
MCV RBC AUTO: 88.4 FL — SIGNIFICANT CHANGE UP (ref 80–100)
MONOCYTES # BLD AUTO: 0.7 K/UL — SIGNIFICANT CHANGE UP (ref 0–0.9)
MONOCYTES NFR BLD AUTO: 8.1 % — SIGNIFICANT CHANGE UP (ref 2–14)
NEUTROPHILS # BLD AUTO: 5.04 K/UL — SIGNIFICANT CHANGE UP (ref 1.8–7.4)
NEUTROPHILS NFR BLD AUTO: 58.5 % — SIGNIFICANT CHANGE UP (ref 43–77)
NON HDL CHOLESTEROL: 108 MG/DL — SIGNIFICANT CHANGE UP
NRBC # BLD: 0 /100 WBCS — SIGNIFICANT CHANGE UP (ref 0–0)
PLATELET # BLD AUTO: 208 K/UL — SIGNIFICANT CHANGE UP (ref 150–400)
POTASSIUM SERPL-MCNC: 2.9 MMOL/L — CRITICAL LOW (ref 3.5–5.3)
POTASSIUM SERPL-SCNC: 2.9 MMOL/L — CRITICAL LOW (ref 3.5–5.3)
PROTHROM AB SERPL-ACNC: 12.6 SEC — SIGNIFICANT CHANGE UP (ref 10.5–13.4)
RBC # BLD: 4.5 M/UL — SIGNIFICANT CHANGE UP (ref 4.2–5.8)
RBC # FLD: 14.1 % — SIGNIFICANT CHANGE UP (ref 10.3–14.5)
SARS-COV-2 RNA SPEC QL NAA+PROBE: SIGNIFICANT CHANGE UP
SODIUM SERPL-SCNC: 142 MMOL/L — SIGNIFICANT CHANGE UP (ref 135–145)
TRIGL SERPL-MCNC: 139 MG/DL — SIGNIFICANT CHANGE UP
WBC # BLD: 8.63 K/UL — SIGNIFICANT CHANGE UP (ref 3.8–10.5)
WBC # FLD AUTO: 8.63 K/UL — SIGNIFICANT CHANGE UP (ref 3.8–10.5)

## 2022-06-14 PROCEDURE — 93010 ELECTROCARDIOGRAM REPORT: CPT

## 2022-06-14 RX ORDER — DIPHENHYDRAMINE HCL 50 MG
50 CAPSULE ORAL ONCE
Refills: 0 | Status: DISCONTINUED | OUTPATIENT
Start: 2022-06-14 | End: 2022-06-29

## 2022-06-14 RX ORDER — HYDROCORTISONE 20 MG
200 TABLET ORAL ONCE
Refills: 0 | Status: COMPLETED | OUTPATIENT
Start: 2022-06-14 | End: 2022-06-14

## 2022-06-14 RX ORDER — CLOPIDOGREL BISULFATE 75 MG/1
600 TABLET, FILM COATED ORAL ONCE
Refills: 0 | Status: COMPLETED | OUTPATIENT
Start: 2022-06-14 | End: 2022-06-14

## 2022-06-14 RX ORDER — SODIUM CHLORIDE 9 MG/ML
500 INJECTION INTRAMUSCULAR; INTRAVENOUS; SUBCUTANEOUS
Refills: 0 | Status: DISCONTINUED | OUTPATIENT
Start: 2022-06-14 | End: 2022-06-29

## 2022-06-14 RX ORDER — ASPIRIN/CALCIUM CARB/MAGNESIUM 324 MG
325 TABLET ORAL ONCE
Refills: 0 | Status: COMPLETED | OUTPATIENT
Start: 2022-06-14 | End: 2022-06-14

## 2022-06-14 RX ADMIN — CLOPIDOGREL BISULFATE 600 MILLIGRAM(S): 75 TABLET, FILM COATED ORAL at 17:05

## 2022-06-14 RX ADMIN — Medication 325 MILLIGRAM(S): at 17:05

## 2022-06-14 RX ADMIN — SODIUM CHLORIDE 75 MILLILITER(S): 9 INJECTION INTRAMUSCULAR; INTRAVENOUS; SUBCUTANEOUS at 16:52

## 2022-06-14 RX ADMIN — Medication 200 MILLIGRAM(S): at 17:06

## 2022-06-27 VITALS
HEART RATE: 91 BPM | TEMPERATURE: 96 F | RESPIRATION RATE: 16 BRPM | SYSTOLIC BLOOD PRESSURE: 188 MMHG | DIASTOLIC BLOOD PRESSURE: 118 MMHG | OXYGEN SATURATION: 96 %

## 2022-06-27 RX ORDER — CHLORHEXIDINE GLUCONATE 213 G/1000ML
1 SOLUTION TOPICAL ONCE
Refills: 0 | Status: DISCONTINUED | OUTPATIENT
Start: 2022-06-28 | End: 2022-07-12

## 2022-06-27 NOTE — H&P ADULT - NSICDXPASTMEDICALHX_GEN_ALL_CORE_FT
PAST MEDICAL HISTORY:  Asthma     Explosive personality disorder in adult goes to [facility]    GERD without esophagitis     H/O iron deficiency     HTN (hypertension)     Lactose intolerance     Low back pain     PATRICE on CPAP

## 2022-06-27 NOTE — H&P ADULT - ASSESSMENT
52yo Male, current daily smoker (up to 1 PPD), occasional marijuana user with a known SHELLFISH ALLERGY (angioedema), and a PMHx of non-obstructive CAD (s/p cardiac cath 5/28/2019 @ [facility]), HTN, HLD, GERD, iron deficiency anemia, PATRICE (on CPAP), and explosive personality disorder (currently not on any medication for this) who presented to cardiologist, Dr. Jackson due to intermittent non radiating left sided chest pain when he is stressed, upset or excited that resolves once he calms down. In addition, patient endorses PARTIDA when ambulating more than 2.5 blocks and chronic lower extremity edema.     Pt H+H stable 4/13 and pt denies any BRBPR, GI bleed, melena, hematuria and denies taking ASA this morning. Pt loaded with 81 mg ASA and 600 plavix.  Pt Creatine 1.08 and appeared euvolemic on exam and got 250ml IV bolus NS and 50cc for 2 hours.  Pt potassium was 2.8 on Lab draw and received total of 60meq of K PO and will recheck BMP   Sedation Moderate  Pt candidate for moderate sedation    Risks & benefits of procedure and alternative therapy have been explained to the patient including but not limited to: allergic reaction, bleeding w/possible need for blood transfusion, infection, renal and vascular compromise, limb damage, arrhythmia, stroke, vessel dissection/perforation, Myocardial infarction, emergent CABG. Informed consent obtained and in chart.       Suitable for moderate sedation.

## 2022-06-27 NOTE — H&P ADULT - HISTORY OF PRESENT ILLNESS
CARDIOLOGIST: Dr. Harish Jackson  COVID: PCR 6/26 - ?to bring results  PHARMACY: [facility] ([address] [address])  Escort:    **Pt left last time prior to cath as he was waiting too long for the procedure  **meds confirmed w/ patient on last visit, states no changes     52yo Male, current daily smoker (up to 1 PPD), occasional marijuana user with a known SHELLFISH ALLERGY (angioedema), and a PMHx of non-obstructive CAD (s/p cardiac cath 5/28/2019 @ [facility]), HTN, HLD, GERD, iron deficiency anemia, PATRICE (on CPAP), and explosive personality disorder (currently not on any medication for this) who presented to cardiologist, Dr. Jackson due to intermittent non radiating left sided chest pain when he is stressed, upset or excited that resolves once he calms down. In addition, patient endorses PARTIDA when ambulating more than 2.5 blocks and chronic lower extremity edema. Pt denies palpitations, orthopnea, PND, abdominal pain, N/V, fever/chills.   Myocardial Perfusion PET/CT (1/28/22): medium sized reversible defect in lateral wall, small reversible defect in anterior wall, and small nonreversible defect involving the inferior wall w/ an adjacent small reversible defect in the inferior wall. The presence of a stress induced anterior wall motion abnormality suggests that the LAD stenosis is severe. LVEF 38%, severe hypokinesis in the lateral wall, moderate hypokinesis in the inferior wall and mild hypokinesis in the anterior and apical walls. Worsening of wall motion in the anterior, inferior and lateral wall.   In light of patient's risk factors, CCS Angina Class IV Symptoms, abnormal NST, patient now presents to [facility] for cardiac catheterization with possible intervention if clinically indicated.   Prior Cath History  Cardiac Cath (5/28/2019): LM normal, mLAD 30% (medium sized), dLAD mild disease (small), LCx mild LI (co-dominant vessel), OM1 mild disease (medium sized)   CARDIOLOGIST: Dr. Harish Jackson  COVID: ESTEPHANIA 6/26 - ?to bring results  PHARMACY: [facility] ([address] [address])  Escort:    **Pt left last time prior to cath as he was waiting too long for the procedure  **Will need premedication for shellfish; not enough time to  outpatient   **meds confirmed w/ patient on last visit, states no changes     52yo Male, current daily smoker (up to 1 PPD), occasional marijuana user with a known SHELLFISH ALLERGY (angioedema), and a PMHx of non-obstructive CAD (s/p cardiac cath 5/28/2019 @ [facility]), HTN, HLD, GERD, iron deficiency anemia, PATRICE (on CPAP), and explosive personality disorder (currently not on any medication for this) who presented to cardiologist, Dr. Jackson due to intermittent non radiating left sided chest pain when he is stressed, upset or excited that resolves once he calms down. In addition, patient endorses PARTIDA when ambulating more than 2.5 blocks and chronic lower extremity edema. Pt denies palpitations, orthopnea, PND, abdominal pain, N/V, fever/chills.   Myocardial Perfusion PET/CT (1/28/22): medium sized reversible defect in lateral wall, small reversible defect in anterior wall, and small nonreversible defect involving the inferior wall w/ an adjacent small reversible defect in the inferior wall. The presence of a stress induced anterior wall motion abnormality suggests that the LAD stenosis is severe. LVEF 38%, severe hypokinesis in the lateral wall, moderate hypokinesis in the inferior wall and mild hypokinesis in the anterior and apical walls. Worsening of wall motion in the anterior, inferior and lateral wall.   In light of patient's risk factors, CCS Angina Class IV Symptoms, abnormal NST, patient now presents to [facility] for cardiac catheterization with possible intervention if clinically indicated.   Prior Cath History  Cardiac Cath (5/28/2019): LM normal, mLAD 30% (medium sized), dLAD mild disease (small), LCx mild LI (co-dominant vessel), OM1 mild disease (medium sized)   CARDIOLOGIST: Dr. Harish Jackson  COVID: PCR 6/26 confirmed Negative   PHARMACY: [facility] [address] [place] [id number]  Escort: Mother  **meds confirmed w/ patient on last visit, states no changes   50yo Male, current daily smoker (up to 1 PPD), occasional marijuana user with a known SHELLFISH ALLERGY (angioedema), and a PMHx of non-obstructive CAD (s/p cardiac cath 5/28/2019 @ [facility]), HTN, HLD, GERD, iron deficiency anemia, PATRICE (on CPAP), and explosive personality disorder (currently not on any medication for this) who presented to cardiologist, Dr. Jackson due to intermittent non radiating left sided chest pain when he is stressed, upset or excited that resolves once he calms down. In addition, patient endorses PARTIDA when ambulating more than 2.5 blocks and chronic lower extremity edema. Pt denies palpitations, orthopnea, PND, abdominal pain, N/V, fever/chills.   Myocardial Perfusion PET/CT (1/28/22): medium sized reversible defect in lateral wall, small reversible defect in anterior wall, and small nonreversible defect involving the inferior wall w/ an adjacent small reversible defect in the inferior wall. The presence of a stress induced anterior wall motion abnormality suggests that the LAD stenosis is severe. LVEF 38%, severe hypokinesis in the lateral wall, moderate hypokinesis in the inferior wall and mild hypokinesis in the anterior and apical walls. Worsening of wall motion in the anterior, inferior and lateral wall.   In light of patient's risk factors, CCS Angina Class IV Symptoms, abnormal NST, patient now presents to [facility] for cardiac catheterization with possible intervention if clinically indicated.   Prior Cath History  Cardiac Cath (5/28/2019): LM normal, mLAD 30% (medium sized), dLAD mild disease (small), LCx mild LI (co-dominant vessel), OM1 mild disease (medium sized)

## 2022-06-28 ENCOUNTER — OUTPATIENT (OUTPATIENT)
Dept: OUTPATIENT SERVICES | Facility: HOSPITAL | Age: 52
LOS: 1 days | Discharge: HOME | End: 2022-06-28
Payer: MEDICARE

## 2022-06-28 DIAGNOSIS — Z98.890 OTHER SPECIFIED POSTPROCEDURAL STATES: Chronic | ICD-10-CM

## 2022-06-28 LAB
A1C WITH ESTIMATED AVERAGE GLUCOSE RESULT: 6.7 % — HIGH (ref 4–5.6)
ALBUMIN SERPL ELPH-MCNC: 4.4 G/DL — SIGNIFICANT CHANGE UP (ref 3.3–5)
ALP SERPL-CCNC: 90 U/L — SIGNIFICANT CHANGE UP (ref 40–120)
ALT FLD-CCNC: 16 U/L — SIGNIFICANT CHANGE UP (ref 10–45)
ANION GAP SERPL CALC-SCNC: 11 MMOL/L — SIGNIFICANT CHANGE UP (ref 5–17)
ANION GAP SERPL CALC-SCNC: 14 MMOL/L — SIGNIFICANT CHANGE UP (ref 5–17)
APTT BLD: 29.1 SEC — SIGNIFICANT CHANGE UP (ref 27.5–35.5)
AST SERPL-CCNC: 18 U/L — SIGNIFICANT CHANGE UP (ref 10–40)
BASOPHILS # BLD AUTO: 0.03 K/UL — SIGNIFICANT CHANGE UP (ref 0–0.2)
BASOPHILS NFR BLD AUTO: 0.3 % — SIGNIFICANT CHANGE UP (ref 0–2)
BILIRUB DIRECT SERPL-MCNC: <0.2 MG/DL — SIGNIFICANT CHANGE UP (ref 0–0.3)
BILIRUB INDIRECT FLD-MCNC: SIGNIFICANT CHANGE UP MG/DL (ref 0.2–1)
BILIRUB SERPL-MCNC: 0.3 MG/DL — SIGNIFICANT CHANGE UP (ref 0.2–1.2)
BUN SERPL-MCNC: 10 MG/DL — SIGNIFICANT CHANGE UP (ref 7–23)
BUN SERPL-MCNC: 12 MG/DL — SIGNIFICANT CHANGE UP (ref 7–23)
CALCIUM SERPL-MCNC: 8.9 MG/DL — SIGNIFICANT CHANGE UP (ref 8.4–10.5)
CALCIUM SERPL-MCNC: 8.9 MG/DL — SIGNIFICANT CHANGE UP (ref 8.4–10.5)
CHLORIDE SERPL-SCNC: 102 MMOL/L — SIGNIFICANT CHANGE UP (ref 96–108)
CHLORIDE SERPL-SCNC: 104 MMOL/L — SIGNIFICANT CHANGE UP (ref 96–108)
CHOLEST SERPL-MCNC: 117 MG/DL — SIGNIFICANT CHANGE UP
CO2 SERPL-SCNC: 26 MMOL/L — SIGNIFICANT CHANGE UP (ref 22–31)
CO2 SERPL-SCNC: 29 MMOL/L — SIGNIFICANT CHANGE UP (ref 22–31)
CREAT SERPL-MCNC: 1.04 MG/DL — SIGNIFICANT CHANGE UP (ref 0.5–1.3)
CREAT SERPL-MCNC: 1.08 MG/DL — SIGNIFICANT CHANGE UP (ref 0.5–1.3)
EGFR: 84 ML/MIN/1.73M2 — SIGNIFICANT CHANGE UP
EGFR: 88 ML/MIN/1.73M2 — SIGNIFICANT CHANGE UP
EOSINOPHIL # BLD AUTO: 0.18 K/UL — SIGNIFICANT CHANGE UP (ref 0–0.5)
EOSINOPHIL NFR BLD AUTO: 2 % — SIGNIFICANT CHANGE UP (ref 0–6)
ESTIMATED AVERAGE GLUCOSE: 146 MG/DL — HIGH (ref 68–114)
GLUCOSE SERPL-MCNC: 256 MG/DL — HIGH (ref 70–99)
GLUCOSE SERPL-MCNC: 94 MG/DL — SIGNIFICANT CHANGE UP (ref 70–99)
HCT VFR BLD CALC: 39.8 % — SIGNIFICANT CHANGE UP (ref 39–50)
HDLC SERPL-MCNC: 19 MG/DL — LOW
HGB BLD-MCNC: 13 G/DL — SIGNIFICANT CHANGE UP (ref 13–17)
IMM GRANULOCYTES NFR BLD AUTO: 0.3 % — SIGNIFICANT CHANGE UP (ref 0–1.5)
INR BLD: 1.12 — SIGNIFICANT CHANGE UP (ref 0.88–1.16)
LIPID PNL WITH DIRECT LDL SERPL: 65 MG/DL — SIGNIFICANT CHANGE UP
LYMPHOCYTES # BLD AUTO: 3.01 K/UL — SIGNIFICANT CHANGE UP (ref 1–3.3)
LYMPHOCYTES # BLD AUTO: 32.7 % — SIGNIFICANT CHANGE UP (ref 13–44)
MCHC RBC-ENTMCNC: 29.1 PG — SIGNIFICANT CHANGE UP (ref 27–34)
MCHC RBC-ENTMCNC: 32.7 GM/DL — SIGNIFICANT CHANGE UP (ref 32–36)
MCV RBC AUTO: 89 FL — SIGNIFICANT CHANGE UP (ref 80–100)
MONOCYTES # BLD AUTO: 0.68 K/UL — SIGNIFICANT CHANGE UP (ref 0–0.9)
MONOCYTES NFR BLD AUTO: 7.4 % — SIGNIFICANT CHANGE UP (ref 2–14)
NEUTROPHILS # BLD AUTO: 5.27 K/UL — SIGNIFICANT CHANGE UP (ref 1.8–7.4)
NEUTROPHILS NFR BLD AUTO: 57.3 % — SIGNIFICANT CHANGE UP (ref 43–77)
NON HDL CHOLESTEROL: 98 MG/DL — SIGNIFICANT CHANGE UP
NRBC # BLD: 0 /100 WBCS — SIGNIFICANT CHANGE UP (ref 0–0)
PLATELET # BLD AUTO: 223 K/UL — SIGNIFICANT CHANGE UP (ref 150–400)
POTASSIUM SERPL-MCNC: 2.8 MMOL/L — CRITICAL LOW (ref 3.5–5.3)
POTASSIUM SERPL-MCNC: 3 MMOL/L — LOW (ref 3.5–5.3)
POTASSIUM SERPL-SCNC: 2.8 MMOL/L — CRITICAL LOW (ref 3.5–5.3)
POTASSIUM SERPL-SCNC: 3 MMOL/L — LOW (ref 3.5–5.3)
PROT SERPL-MCNC: 6.9 G/DL — SIGNIFICANT CHANGE UP (ref 6–8.3)
PROTHROM AB SERPL-ACNC: 13.3 SEC — SIGNIFICANT CHANGE UP (ref 10.5–13.4)
RBC # BLD: 4.47 M/UL — SIGNIFICANT CHANGE UP (ref 4.2–5.8)
RBC # FLD: 14.4 % — SIGNIFICANT CHANGE UP (ref 10.3–14.5)
SODIUM SERPL-SCNC: 141 MMOL/L — SIGNIFICANT CHANGE UP (ref 135–145)
SODIUM SERPL-SCNC: 145 MMOL/L — SIGNIFICANT CHANGE UP (ref 135–145)
TRIGL SERPL-MCNC: 167 MG/DL — HIGH
WBC # BLD: 9.2 K/UL — SIGNIFICANT CHANGE UP (ref 3.8–10.5)
WBC # FLD AUTO: 9.2 K/UL — SIGNIFICANT CHANGE UP (ref 3.8–10.5)

## 2022-06-28 PROCEDURE — 93458 L HRT ARTERY/VENTRICLE ANGIO: CPT | Mod: 26

## 2022-06-28 PROCEDURE — 99152 MOD SED SAME PHYS/QHP 5/>YRS: CPT

## 2022-06-28 RX ORDER — METOPROLOL TARTRATE 50 MG
100 TABLET ORAL ONCE
Refills: 0 | Status: COMPLETED | OUTPATIENT
Start: 2022-06-28 | End: 2022-06-28

## 2022-06-28 RX ORDER — CLOPIDOGREL BISULFATE 75 MG/1
600 TABLET, FILM COATED ORAL ONCE
Refills: 0 | Status: COMPLETED | OUTPATIENT
Start: 2022-06-28 | End: 2022-06-28

## 2022-06-28 RX ORDER — HYDROCORTISONE 20 MG
200 TABLET ORAL ONCE
Refills: 0 | Status: COMPLETED | OUTPATIENT
Start: 2022-06-28 | End: 2022-06-28

## 2022-06-28 RX ORDER — POTASSIUM CHLORIDE 20 MEQ
40 PACKET (EA) ORAL ONCE
Refills: 0 | Status: COMPLETED | OUTPATIENT
Start: 2022-06-28 | End: 2022-06-28

## 2022-06-28 RX ORDER — DIPHENHYDRAMINE HCL 50 MG
50 CAPSULE ORAL ONCE
Refills: 0 | Status: COMPLETED | OUTPATIENT
Start: 2022-06-28 | End: 2022-06-28

## 2022-06-28 RX ORDER — SODIUM CHLORIDE 9 MG/ML
250 INJECTION INTRAMUSCULAR; INTRAVENOUS; SUBCUTANEOUS ONCE
Refills: 0 | Status: COMPLETED | OUTPATIENT
Start: 2022-06-28 | End: 2022-06-28

## 2022-06-28 RX ORDER — ASPIRIN/CALCIUM CARB/MAGNESIUM 324 MG
81 TABLET ORAL ONCE
Refills: 0 | Status: COMPLETED | OUTPATIENT
Start: 2022-06-28 | End: 2022-06-28

## 2022-06-28 RX ORDER — SPIRONOLACTONE 25 MG/1
25 TABLET, FILM COATED ORAL ONCE
Refills: 0 | Status: COMPLETED | OUTPATIENT
Start: 2022-06-28 | End: 2022-06-28

## 2022-06-28 RX ORDER — POTASSIUM CHLORIDE 20 MEQ
60 PACKET (EA) ORAL ONCE
Refills: 0 | Status: DISCONTINUED | OUTPATIENT
Start: 2022-06-28 | End: 2022-06-28

## 2022-06-28 RX ORDER — SODIUM CHLORIDE 9 MG/ML
500 INJECTION INTRAMUSCULAR; INTRAVENOUS; SUBCUTANEOUS
Refills: 0 | Status: DISCONTINUED | OUTPATIENT
Start: 2022-06-28 | End: 2022-07-12

## 2022-06-28 RX ORDER — AMLODIPINE BESYLATE 2.5 MG/1
10 TABLET ORAL ONCE
Refills: 0 | Status: COMPLETED | OUTPATIENT
Start: 2022-06-28 | End: 2022-06-28

## 2022-06-28 RX ORDER — POTASSIUM CHLORIDE 20 MEQ
20 PACKET (EA) ORAL ONCE
Refills: 0 | Status: DISCONTINUED | OUTPATIENT
Start: 2022-06-28 | End: 2022-06-28

## 2022-06-28 RX ORDER — LOSARTAN POTASSIUM 100 MG/1
100 TABLET, FILM COATED ORAL ONCE
Refills: 0 | Status: COMPLETED | OUTPATIENT
Start: 2022-06-28 | End: 2022-06-28

## 2022-06-28 RX ADMIN — CLOPIDOGREL BISULFATE 600 MILLIGRAM(S): 75 TABLET, FILM COATED ORAL at 14:17

## 2022-06-28 RX ADMIN — Medication 200 MILLIGRAM(S): at 14:03

## 2022-06-28 RX ADMIN — SODIUM CHLORIDE 75 MILLILITER(S): 9 INJECTION INTRAMUSCULAR; INTRAVENOUS; SUBCUTANEOUS at 14:04

## 2022-06-28 RX ADMIN — Medication 50 MILLIGRAM(S): at 14:03

## 2022-06-28 RX ADMIN — SPIRONOLACTONE 25 MILLIGRAM(S): 25 TABLET, FILM COATED ORAL at 18:12

## 2022-06-28 RX ADMIN — SODIUM CHLORIDE 500 MILLILITER(S): 9 INJECTION INTRAMUSCULAR; INTRAVENOUS; SUBCUTANEOUS at 14:03

## 2022-06-28 RX ADMIN — Medication 40 MILLIEQUIVALENT(S): at 16:16

## 2022-06-28 RX ADMIN — LOSARTAN POTASSIUM 100 MILLIGRAM(S): 100 TABLET, FILM COATED ORAL at 16:00

## 2022-06-28 RX ADMIN — AMLODIPINE BESYLATE 10 MILLIGRAM(S): 2.5 TABLET ORAL at 14:30

## 2022-06-28 RX ADMIN — Medication 100 MILLIGRAM(S): at 17:26

## 2022-06-28 RX ADMIN — Medication 81 MILLIGRAM(S): at 14:17

## 2022-06-28 RX ADMIN — Medication 40 MILLIEQUIVALENT(S): at 14:30

## 2022-06-28 NOTE — PROGRESS NOTE ADULT - SUBJECTIVE AND OBJECTIVE BOX
Interventional Cardiology PA SDA Discharge Note    Patient without complaints. Ambulated and voided without difficulties    Afebrile, VSS    Ext:    		  		Right       Radial :    hematoma,     bleeding, dressing C/D/I      Pulses:    intact RAD to baseline     A/P:      50yo Male, current daily smoker (up to 1 PPD), occasional marijuana user with a known SHELLFISH ALLERGY (angioedema), and a PMHx of non-obstructive CAD (s/p cardiac cath 5/28/2019 @ [facility]), HTN, HLD, GERD, iron deficiency anemia, PATRICE (on CPAP), and explosive personality disorder (currently not on any medication for this) who presented to cardiologist, Dr. Jackson due to intermittent non radiating left sided chest pain when he is stressed, upset or excited that resolves once he calms down. In addition, patient endorses PARTIDA when ambulating more than 2.5 blocks and chronic lower extremity edema. Pt denies palpitations, orthopnea, PND, abdominal pain, N/V, fever/chills.   Myocardial Perfusion PET/CT (1/28/22): medium sized reversible defect in lateral wall, small reversible defect in anterior wall, and small nonreversible defect involving the inferior wall w/ an adjacent small reversible defect in the inferior wall. The presence of a stress induced anterior wall motion abnormality suggests that the LAD stenosis is severe. LVEF 38%, severe hypokinesis in the lateral wall, moderate hypokinesis in the inferior wall and mild hypokinesis in the anterior and apical walls. Worsening of wall motion in the anterior, inferior and lateral wall.   In light of patient's risk factors, CCS Angina Class IV Symptoms, abnormal NST, patient now presents to [facility] for cardiac catheterization with possible intervention if clinically indicated. s/p cardiac cath 6/28/22: LM nl, mLAD 30%, dLAD mild luminal (small), LCX mild luminal codominant, OM1 mild luminal, RCA mild luminal codominant. EDP 20 mmHg.             1.	Stable for discharge as per attending Dr. Villa after bed rest, pt voids, wrist stable and 30 minutes of ambulation.  2.	Follow-up with PMD/Cardiologist Dr Jackson in 1-2 weeks  3.	Discharged forms signed and copies in chart

## 2022-07-06 DIAGNOSIS — I25.110 ATHEROSCLEROTIC HEART DISEASE OF NATIVE CORONARY ARTERY WITH UNSTABLE ANGINA PECTORIS: ICD-10-CM

## 2022-07-06 DIAGNOSIS — R94.39 ABNORMAL RESULT OF OTHER CARDIOVASCULAR FUNCTION STUDY: ICD-10-CM

## 2023-08-23 NOTE — H&P ADULT - FEMORAL PULSE
What Is The Reason For Today's Visit?: History of Melanoma
Year Excised?: 2004
No bruit b/l/right normal/left normal

## 2024-04-22 RX ORDER — FLUTICASONE PROPIONATE AND SALMETEROL 50; 250 UG/1; UG/1
2 POWDER ORAL; RESPIRATORY (INHALATION)
Qty: 0 | Refills: 0 | DISCHARGE

## 2024-04-22 RX ORDER — AMLODIPINE BESYLATE 2.5 MG/1
1 TABLET ORAL
Qty: 0 | Refills: 0 | DISCHARGE

## 2024-04-22 RX ORDER — BACLOFEN 100 %
1 POWDER (GRAM) MISCELLANEOUS
Qty: 0 | Refills: 0 | DISCHARGE

## 2024-04-22 RX ORDER — LOSARTAN POTASSIUM 100 MG/1
1 TABLET, FILM COATED ORAL
Qty: 0 | Refills: 0 | DISCHARGE

## 2024-04-22 RX ORDER — DIVALPROEX SODIUM 500 MG/1
1 TABLET, DELAYED RELEASE ORAL
Qty: 0 | Refills: 0 | DISCHARGE

## 2024-04-22 RX ORDER — NABUMETONE 750 MG
1 TABLET ORAL
Qty: 0 | Refills: 0 | DISCHARGE

## 2024-04-22 RX ORDER — ALBUTEROL 90 UG/1
2 AEROSOL, METERED ORAL
Qty: 0 | Refills: 0 | DISCHARGE

## 2024-04-22 RX ORDER — SPIRONOLACTONE 25 MG/1
1 TABLET, FILM COATED ORAL
Qty: 0 | Refills: 0 | DISCHARGE

## 2024-04-22 RX ORDER — CETIRIZINE HYDROCHLORIDE 10 MG/1
1 TABLET ORAL
Qty: 0 | Refills: 0 | DISCHARGE

## 2024-04-22 RX ORDER — QUETIAPINE FUMARATE 200 MG/1
1 TABLET, FILM COATED ORAL
Qty: 0 | Refills: 0 | DISCHARGE

## 2024-04-22 RX ORDER — ERGOCALCIFEROL 1.25 MG/1
1 CAPSULE ORAL
Qty: 0 | Refills: 0 | DISCHARGE

## 2024-04-22 RX ORDER — BUPROPION HYDROCHLORIDE 150 MG/1
1 TABLET, EXTENDED RELEASE ORAL
Qty: 0 | Refills: 0 | DISCHARGE

## 2024-04-22 RX ORDER — FERROUS GLUCONATE 100 %
1 POWDER (GRAM) MISCELLANEOUS
Qty: 0 | Refills: 0 | DISCHARGE

## 2024-04-22 RX ORDER — ASPIRIN/CALCIUM CARB/MAGNESIUM 324 MG
1 TABLET ORAL
Qty: 0 | Refills: 0 | DISCHARGE

## 2024-04-22 RX ORDER — METOPROLOL TARTRATE 50 MG
1 TABLET ORAL
Qty: 0 | Refills: 0 | DISCHARGE

## 2024-04-22 RX ORDER — MELOXICAM 15 MG/1
0 TABLET ORAL
Qty: 0 | Refills: 0 | DISCHARGE

## 2024-05-07 NOTE — H&P ADULT - BP NONINVASIVE DIASTOLIC (MM HG)
99
Detail Level: Zone
Photo Preface (Leave Blank If You Do Not Want): Photographs were obtained today